# Patient Record
Sex: MALE | Race: WHITE | NOT HISPANIC OR LATINO | Employment: OTHER | URBAN - NONMETROPOLITAN AREA
[De-identification: names, ages, dates, MRNs, and addresses within clinical notes are randomized per-mention and may not be internally consistent; named-entity substitution may affect disease eponyms.]

---

## 2023-05-08 DIAGNOSIS — E03.9 HYPOTHYROIDISM, UNSPECIFIED TYPE: ICD-10-CM

## 2023-05-08 DIAGNOSIS — F41.8 ANXIETY WITH DEPRESSION: ICD-10-CM

## 2023-05-08 RX ORDER — LEVOTHYROXINE SODIUM 50 UG/1
50 TABLET ORAL
Qty: 90 TABLET | Refills: 0 | Status: SHIPPED | OUTPATIENT
Start: 2023-05-08 | End: 2023-08-22 | Stop reason: SDUPTHER

## 2023-05-08 RX ORDER — CITALOPRAM 40 MG/1
40 TABLET, FILM COATED ORAL DAILY
Qty: 90 TABLET | Refills: 0 | Status: SHIPPED | OUTPATIENT
Start: 2023-05-08 | End: 2023-08-09 | Stop reason: SDUPTHER

## 2023-05-08 RX ORDER — CITALOPRAM 40 MG/1
40 TABLET, FILM COATED ORAL DAILY
COMMUNITY
Start: 2022-02-11 | End: 2023-05-08 | Stop reason: SDUPTHER

## 2023-05-08 RX ORDER — LEVOTHYROXINE SODIUM 50 UG/1
50 TABLET ORAL
COMMUNITY
Start: 2022-02-11 | End: 2023-05-08 | Stop reason: SDUPTHER

## 2023-08-09 DIAGNOSIS — F41.8 ANXIETY WITH DEPRESSION: ICD-10-CM

## 2023-08-09 RX ORDER — CITALOPRAM 40 MG/1
40 TABLET, FILM COATED ORAL DAILY
Qty: 90 TABLET | Refills: 1 | Status: SHIPPED | OUTPATIENT
Start: 2023-08-09 | End: 2023-11-13

## 2023-08-22 ENCOUNTER — TELEPHONE (OUTPATIENT)
Dept: PRIMARY CARE | Facility: CLINIC | Age: 73
End: 2023-08-22
Payer: MEDICARE

## 2023-08-22 DIAGNOSIS — E03.9 HYPOTHYROIDISM, UNSPECIFIED TYPE: ICD-10-CM

## 2023-08-22 RX ORDER — LEVOTHYROXINE SODIUM 50 UG/1
50 TABLET ORAL
Qty: 90 TABLET | Refills: 0 | Status: SHIPPED | OUTPATIENT
Start: 2023-08-22 | End: 2023-11-29

## 2023-10-26 ENCOUNTER — OFFICE VISIT (OUTPATIENT)
Dept: PRIMARY CARE | Facility: CLINIC | Age: 73
End: 2023-10-26
Payer: MEDICARE

## 2023-10-26 VITALS
OXYGEN SATURATION: 97 % | HEART RATE: 72 BPM | TEMPERATURE: 96.5 F | WEIGHT: 211 LBS | DIASTOLIC BLOOD PRESSURE: 70 MMHG | SYSTOLIC BLOOD PRESSURE: 118 MMHG | BODY MASS INDEX: 30.21 KG/M2 | HEIGHT: 70 IN

## 2023-10-26 DIAGNOSIS — R76.9 ABNORMAL IMMUNOLOGICAL FINDING IN SERUM, UNSPECIFIED: ICD-10-CM

## 2023-10-26 DIAGNOSIS — R09.89 BRUIT OF RIGHT CAROTID ARTERY: ICD-10-CM

## 2023-10-26 DIAGNOSIS — Z71.89 CARDIAC RISK COUNSELING: ICD-10-CM

## 2023-10-26 DIAGNOSIS — E55.9 VITAMIN D DEFICIENCY, UNSPECIFIED: ICD-10-CM

## 2023-10-26 DIAGNOSIS — Z13.6 SCREENING FOR CARDIOVASCULAR CONDITION: ICD-10-CM

## 2023-10-26 DIAGNOSIS — R73.03 PREDIABETES: ICD-10-CM

## 2023-10-26 DIAGNOSIS — Z00.00 ROUTINE GENERAL MEDICAL EXAMINATION AT HEALTH CARE FACILITY: Primary | ICD-10-CM

## 2023-10-26 DIAGNOSIS — Z12.5 SCREENING FOR PROSTATE CANCER: ICD-10-CM

## 2023-10-26 DIAGNOSIS — Z11.4 SCREENING FOR HIV WITHOUT PRESENCE OF RISK FACTORS: ICD-10-CM

## 2023-10-26 DIAGNOSIS — Z00.00 HEALTHCARE MAINTENANCE: ICD-10-CM

## 2023-10-26 PROBLEM — L30.9 ECZEMA: Status: ACTIVE | Noted: 2023-10-26

## 2023-10-26 PROBLEM — F41.9 ANXIETY AND DEPRESSION: Status: ACTIVE | Noted: 2019-07-31

## 2023-10-26 PROBLEM — C18.9 COLON CANCER (MULTI): Status: ACTIVE | Noted: 2023-10-26

## 2023-10-26 PROBLEM — Z85.850 HISTORY OF THYROID CANCER: Status: ACTIVE | Noted: 2019-07-31

## 2023-10-26 PROBLEM — L40.50 PSORIASIS WITH ARTHROPATHY (MULTI): Status: ACTIVE | Noted: 2023-10-26

## 2023-10-26 PROBLEM — G56.03 BILATERAL CARPAL TUNNEL SYNDROME: Status: ACTIVE | Noted: 2023-10-26

## 2023-10-26 PROBLEM — M25.50 ARTHRALGIA OF MULTIPLE JOINTS: Status: ACTIVE | Noted: 2023-10-26

## 2023-10-26 PROBLEM — C73 THYROID CANCER (MULTI): Status: ACTIVE | Noted: 2023-10-26

## 2023-10-26 PROBLEM — E03.9 HYPOTHYROIDISM: Status: ACTIVE | Noted: 2023-10-26

## 2023-10-26 PROBLEM — F32.A ANXIETY AND DEPRESSION: Status: ACTIVE | Noted: 2019-07-31

## 2023-10-26 PROBLEM — R79.89 LOW TESTOSTERONE: Status: ACTIVE | Noted: 2019-07-31

## 2023-10-26 PROBLEM — E89.0 POSTOPERATIVE HYPOTHYROIDISM: Status: ACTIVE | Noted: 2019-07-31

## 2023-10-26 PROCEDURE — 1170F FXNL STATUS ASSESSED: CPT

## 2023-10-26 PROCEDURE — 1036F TOBACCO NON-USER: CPT

## 2023-10-26 PROCEDURE — 80061 LIPID PANEL: CPT

## 2023-10-26 PROCEDURE — 84443 ASSAY THYROID STIM HORMONE: CPT

## 2023-10-26 PROCEDURE — 80053 COMPREHEN METABOLIC PANEL: CPT

## 2023-10-26 PROCEDURE — G0103 PSA SCREENING: HCPCS

## 2023-10-26 PROCEDURE — 1159F MED LIST DOCD IN RCRD: CPT

## 2023-10-26 PROCEDURE — 83036 HEMOGLOBIN GLYCOSYLATED A1C: CPT

## 2023-10-26 PROCEDURE — 86803 HEPATITIS C AB TEST: CPT

## 2023-10-26 PROCEDURE — 85025 COMPLETE CBC W/AUTO DIFF WBC: CPT

## 2023-10-26 PROCEDURE — 36415 COLL VENOUS BLD VENIPUNCTURE: CPT

## 2023-10-26 PROCEDURE — G0439 PPPS, SUBSEQ VISIT: HCPCS

## 2023-10-26 PROCEDURE — 87389 HIV-1 AG W/HIV-1&-2 AB AG IA: CPT

## 2023-10-26 PROCEDURE — 82306 VITAMIN D 25 HYDROXY: CPT

## 2023-10-26 ASSESSMENT — ACTIVITIES OF DAILY LIVING (ADL)
TAKING_MEDICATION: INDEPENDENT
DRESSING: INDEPENDENT
MANAGING_FINANCES: INDEPENDENT
BATHING: INDEPENDENT
DOING_HOUSEWORK: INDEPENDENT
GROCERY_SHOPPING: INDEPENDENT

## 2023-10-26 ASSESSMENT — ENCOUNTER SYMPTOMS
WEAKNESS: 0
HEADACHES: 0
SHORTNESS OF BREATH: 0
PSYCHIATRIC NEGATIVE: 1
GASTROINTESTINAL NEGATIVE: 1
LOSS OF SENSATION IN FEET: 0
ALLERGIC/IMMUNOLOGIC NEGATIVE: 1
OCCASIONAL FEELINGS OF UNSTEADINESS: 0
UNEXPECTED WEIGHT CHANGE: 0
FATIGUE: 0
DIZZINESS: 0
DEPRESSION: 0
RESPIRATORY NEGATIVE: 1
CARDIOVASCULAR NEGATIVE: 1
ACTIVITY CHANGE: 0
ENDOCRINE NEGATIVE: 1
MUSCULOSKELETAL NEGATIVE: 1
ABDOMINAL PAIN: 0
FEVER: 0

## 2023-10-26 ASSESSMENT — PATIENT HEALTH QUESTIONNAIRE - PHQ9
SUM OF ALL RESPONSES TO PHQ9 QUESTIONS 1 AND 2: 0
1. LITTLE INTEREST OR PLEASURE IN DOING THINGS: NOT AT ALL
2. FEELING DOWN, DEPRESSED OR HOPELESS: NOT AT ALL

## 2023-10-26 ASSESSMENT — VISUAL ACUITY
OD_CC: 20/50
OS_CC: 20/30

## 2023-10-26 NOTE — PROGRESS NOTES
"Subjective   Reason for Visit: Calos Tillman is an 73 y.o. male here for a Medicare Wellness visit.          Reviewed all medications by prescribing practitioner or clinical pharmacist (such as prescriptions, OTCs, herbal therapies and supplements) and documented in the medical record.    Medicare wellness and establishing care today  Due for lab work today    Sensation of \"Vibration\" in left chest wall, region is inferior to where Med Port was removed previously following Chemo for colon cancer  Does not have associated palpitations  No chest pain  Occurs almost daily    Family History of CVD in his brother, MI with stent placement a couple years ago  He has Carotid Bruit and given history, will do CT Cardiac scoring. US Carotid.     Allergic Rhinitis  Chronic, taking Decongestant for years, BP stable.     Prediabetes  Glucose on past draws have been borderline  Uses sugar in coffee  A1c today.         Patient Care Team:  PREET Bryant-CNP as PCP - General (Family Medicine)     Review of Systems   Constitutional:  Negative for activity change, fatigue, fever and unexpected weight change.   HENT: Negative.     Respiratory: Negative.  Negative for shortness of breath.    Cardiovascular: Negative.  Negative for chest pain.        Vibratory sense in chest wall left side.    Gastrointestinal: Negative.  Negative for abdominal pain.   Endocrine: Negative.    Musculoskeletal: Negative.    Skin: Negative.    Allergic/Immunologic: Negative.    Neurological:  Negative for dizziness, weakness and headaches.   Psychiatric/Behavioral: Negative.         Objective   Vitals:  /70   Pulse 72   Temp 35.8 °C (96.5 °F)   Ht 1.778 m (5' 10\")   Wt 95.7 kg (211 lb)   SpO2 97%   BMI 30.28 kg/m²       Physical Exam  Vitals and nursing note reviewed.   Constitutional:       Appearance: Normal appearance.   HENT:      Head: Normocephalic.      Mouth/Throat:      Mouth: Mucous membranes are moist.   Cardiovascular:      " "Rate and Rhythm: Normal rate and regular rhythm.      Pulses: Normal pulses.           Carotid pulses are  on the right side with bruit.     Heart sounds: Normal heart sounds, S1 normal and S2 normal. No murmur heard.     No friction rub. No gallop.   Pulmonary:      Effort: Pulmonary effort is normal. No respiratory distress.      Breath sounds: Normal breath sounds. No wheezing.   Chest:          Comments: Med Port previously within [place], scar is CDI   Arrow delineates where the \"vibration\" sense is  No tenderness on palpation, no erythema, no warmth.   Abdominal:      General: Bowel sounds are normal. There is no distension.      Palpations: Abdomen is soft.      Tenderness: There is no abdominal tenderness.   Musculoskeletal:         General: No deformity. Normal range of motion.      Right lower leg: No edema.      Left lower leg: No edema.   Skin:     General: Skin is warm and dry.      Capillary Refill: Capillary refill takes less than 2 seconds.   Neurological:      General: No focal deficit present.      Mental Status: He is alert and oriented to person, place, and time.   Psychiatric:         Mood and Affect: Mood normal.         Assessment/Plan   Problem List Items Addressed This Visit    None  Visit Diagnoses       Routine general medical examination at health care facility    -  Primary    Relevant Orders    1 Year Follow Up In Primary Care - Wellness Exam    Screening for cardiovascular condition        Cardiac risk counseling        Relevant Orders    Vascular US carotid artery duplex bilateral    CT cardiac scoring wo IV contrast    Screening for HIV without presence of risk factors        Screening for prostate cancer        Relevant Orders    Prostate Specific Antigen, Screen    Healthcare maintenance        Relevant Orders    Hepatitis C Antibody    HIV 1/2 Antigen/Antibody Screen with Reflex to Confirmation    Vitamin D 25-Hydroxy,Total (for eval of Vitamin D levels)    CBC and Auto " Differential    Comprehensive Metabolic Panel    Lipid Panel    TSH with reflex to Free T4 if abnormal    Bruit of right carotid artery        Relevant Orders    Vascular US carotid artery duplex bilateral    Prediabetes        Relevant Orders    Hemoglobin A1C    Vitamin D deficiency, unspecified        Relevant Orders    Vitamin D 25-Hydroxy,Total (for eval of Vitamin D levels)    Abnormal immunological finding in serum, unspecified        Relevant Orders    CBC and Auto Differential

## 2023-10-26 NOTE — PATIENT INSTRUCTIONS
CT Cardiac Scoring, US carotid   Labs today, we will call with results.     Thank you for coming in today, if any questions or concerns arise, please call my office.   PREET Bryant-CNP

## 2023-10-27 ENCOUNTER — TELEPHONE (OUTPATIENT)
Dept: PRIMARY CARE | Facility: CLINIC | Age: 73
End: 2023-10-27
Payer: MEDICARE

## 2023-10-27 LAB
25(OH)D3 SERPL-MCNC: 39 NG/ML (ref 30–100)
ALBUMIN SERPL BCP-MCNC: 4.7 G/DL (ref 3.4–5)
ALP SERPL-CCNC: 67 U/L (ref 33–136)
ALT SERPL W P-5'-P-CCNC: 33 U/L (ref 10–52)
ANION GAP SERPL CALC-SCNC: 17 MMOL/L (ref 10–20)
AST SERPL W P-5'-P-CCNC: 24 U/L (ref 9–39)
BASOPHILS # BLD AUTO: 0.04 X10*3/UL (ref 0–0.1)
BASOPHILS NFR BLD AUTO: 0.8 %
BILIRUB SERPL-MCNC: 0.5 MG/DL (ref 0–1.2)
BUN SERPL-MCNC: 14 MG/DL (ref 6–23)
CALCIUM SERPL-MCNC: 10.2 MG/DL (ref 8.6–10.6)
CHLORIDE SERPL-SCNC: 101 MMOL/L (ref 98–107)
CHOLEST SERPL-MCNC: 181 MG/DL (ref 0–199)
CHOLESTEROL/HDL RATIO: 3.9
CO2 SERPL-SCNC: 26 MMOL/L (ref 21–32)
CREAT SERPL-MCNC: 0.83 MG/DL (ref 0.5–1.3)
EOSINOPHIL # BLD AUTO: 0.15 X10*3/UL (ref 0–0.4)
EOSINOPHIL NFR BLD AUTO: 3.2 %
ERYTHROCYTE [DISTWIDTH] IN BLOOD BY AUTOMATED COUNT: 13.6 % (ref 11.5–14.5)
EST. AVERAGE GLUCOSE BLD GHB EST-MCNC: 123 MG/DL
GFR SERPL CREATININE-BSD FRML MDRD: >90 ML/MIN/1.73M*2
GLUCOSE SERPL-MCNC: 104 MG/DL (ref 74–99)
HBA1C MFR BLD: 5.9 %
HCT VFR BLD AUTO: 44.5 % (ref 41–52)
HCV AB SER QL: NONREACTIVE
HDLC SERPL-MCNC: 46.8 MG/DL
HGB BLD-MCNC: 14.3 G/DL (ref 13.5–17.5)
HIV 1+2 AB+HIV1 P24 AG SERPL QL IA: NONREACTIVE
IMM GRANULOCYTES # BLD AUTO: 0.03 X10*3/UL (ref 0–0.5)
IMM GRANULOCYTES NFR BLD AUTO: 0.6 % (ref 0–0.9)
LDLC SERPL CALC-MCNC: 93 MG/DL
LYMPHOCYTES # BLD AUTO: 1.81 X10*3/UL (ref 0.8–3)
LYMPHOCYTES NFR BLD AUTO: 38 %
MCH RBC QN AUTO: 26.9 PG (ref 26–34)
MCHC RBC AUTO-ENTMCNC: 32.1 G/DL (ref 32–36)
MCV RBC AUTO: 84 FL (ref 80–100)
MONOCYTES # BLD AUTO: 0.48 X10*3/UL (ref 0.05–0.8)
MONOCYTES NFR BLD AUTO: 10.1 %
NEUTROPHILS # BLD AUTO: 2.25 X10*3/UL (ref 1.6–5.5)
NEUTROPHILS NFR BLD AUTO: 47.3 %
NON HDL CHOLESTEROL: 134 MG/DL (ref 0–149)
NRBC BLD-RTO: 0 /100 WBCS (ref 0–0)
PLATELET # BLD AUTO: 257 X10*3/UL (ref 150–450)
PMV BLD AUTO: 10.5 FL (ref 7.5–11.5)
POTASSIUM SERPL-SCNC: 4.4 MMOL/L (ref 3.5–5.3)
PROT SERPL-MCNC: 7.3 G/DL (ref 6.4–8.2)
PSA SERPL-MCNC: 1.28 NG/ML
RBC # BLD AUTO: 5.32 X10*6/UL (ref 4.5–5.9)
SODIUM SERPL-SCNC: 140 MMOL/L (ref 136–145)
TRIGL SERPL-MCNC: 204 MG/DL (ref 0–149)
TSH SERPL-ACNC: 1.34 MIU/L (ref 0.44–3.98)
VLDL: 41 MG/DL (ref 0–40)
WBC # BLD AUTO: 4.8 X10*3/UL (ref 4.4–11.3)

## 2023-10-27 NOTE — RESULT ENCOUNTER NOTE
Labs look good, triglycerides are up, exercise and healthy diet, otherwise cholesterol numbers are better. TSH normal, PSA normal.

## 2023-10-27 NOTE — TELEPHONE ENCOUNTER
----- Message from PREET Bryant-CNP sent at 10/27/2023  8:26 AM EDT -----  Labs look good, triglycerides are up, exercise and healthy diet, otherwise cholesterol numbers are better. TSH normal, PSA normal.

## 2023-10-30 ENCOUNTER — TELEPHONE (OUTPATIENT)
Dept: PRIMARY CARE | Facility: CLINIC | Age: 73
End: 2023-10-30
Payer: MEDICARE

## 2023-11-08 DIAGNOSIS — F41.8 ANXIETY WITH DEPRESSION: ICD-10-CM

## 2023-11-13 RX ORDER — CITALOPRAM 40 MG/1
40 TABLET, FILM COATED ORAL DAILY
Qty: 90 TABLET | Refills: 1 | Status: SHIPPED | OUTPATIENT
Start: 2023-11-13 | End: 2024-02-13 | Stop reason: SDUPTHER

## 2023-11-27 DIAGNOSIS — E03.9 HYPOTHYROIDISM, UNSPECIFIED TYPE: ICD-10-CM

## 2023-11-29 DIAGNOSIS — L30.9 ECZEMA, UNSPECIFIED TYPE: ICD-10-CM

## 2023-11-29 RX ORDER — CICLOPIROX OLAMINE 7.7 MG/G
CREAM TOPICAL 2 TIMES DAILY
COMMUNITY
End: 2023-11-29 | Stop reason: SDUPTHER

## 2023-11-29 RX ORDER — TRIAMCINOLONE ACETONIDE 1 MG/G
CREAM TOPICAL 2 TIMES DAILY
COMMUNITY
Start: 2021-09-14 | End: 2023-11-29 | Stop reason: SDUPTHER

## 2023-11-29 RX ORDER — TRIAMCINOLONE ACETONIDE 1 MG/G
CREAM TOPICAL 2 TIMES DAILY
Qty: 2 G | Refills: 0 | Status: SHIPPED | OUTPATIENT
Start: 2023-11-29

## 2023-11-29 RX ORDER — LEVOTHYROXINE SODIUM 50 UG/1
50 TABLET ORAL
Qty: 90 TABLET | Refills: 0 | Status: SHIPPED | OUTPATIENT
Start: 2023-11-29 | End: 2024-03-08 | Stop reason: SDUPTHER

## 2023-11-29 RX ORDER — CICLOPIROX OLAMINE 7.7 MG/G
CREAM TOPICAL 2 TIMES DAILY
Qty: 2 G | Refills: 0 | Status: SHIPPED | OUTPATIENT
Start: 2023-11-29

## 2024-01-05 ENCOUNTER — HOSPITAL ENCOUNTER (OUTPATIENT)
Dept: VASCULAR MEDICINE | Facility: HOSPITAL | Age: 74
Discharge: HOME | End: 2024-01-05
Payer: MEDICARE

## 2024-01-05 ENCOUNTER — HOSPITAL ENCOUNTER (OUTPATIENT)
Dept: RADIOLOGY | Facility: HOSPITAL | Age: 74
Discharge: HOME | End: 2024-01-05
Payer: MEDICARE

## 2024-01-05 ENCOUNTER — TELEPHONE (OUTPATIENT)
Dept: PRIMARY CARE | Facility: CLINIC | Age: 74
End: 2024-01-05

## 2024-01-05 DIAGNOSIS — Z71.89 CARDIAC RISK COUNSELING: ICD-10-CM

## 2024-01-05 DIAGNOSIS — R09.89 BRUIT OF RIGHT CAROTID ARTERY: ICD-10-CM

## 2024-01-05 PROCEDURE — 93880 EXTRACRANIAL BILAT STUDY: CPT

## 2024-01-05 PROCEDURE — 93880 EXTRACRANIAL BILAT STUDY: CPT | Performed by: INTERNAL MEDICINE

## 2024-01-05 PROCEDURE — 75571 CT HRT W/O DYE W/CA TEST: CPT

## 2024-01-05 NOTE — TELEPHONE ENCOUNTER
----- Message from SHMUEL Bryant sent at 1/5/2024  8:48 AM EST -----  CT Cardiac scoring of zero. Negative study.

## 2024-01-05 NOTE — LETTER
January 5, 2024     Calos Flowerelma  [address] [facility] [address]      Dear Mr. Tillman:    Below are the results from your recent visit:    Resulted Orders   CT cardiac scoring wo IV contrast    Narrative    Interpreted By:  Romero Coto,   STUDY:  CT CARDIAC SCORING WO IV CONTRAST;  1/5/2024 7:17 am      INDICATION:  Signs/Symptoms:Carotid Bruit, Fam Hx premature cardiovascular disease.      COMPARISON:  None.      ACCESSION NUMBER(S):  [id number]      ORDERING CLINICIAN:  KATIE ECHEVARRIA      TECHNIQUE:  Using prospective ECG gating, CT scan of the coronary arteries was  performed without intravenous contrast. Coronary calcium scoring  was  performed according to the method of Agatston.      FINDINGS:  The score and distribution of calcium in the coronary arteries is as  follows:      LM             0  LAD           0  LCx            0  RCA           0      Total         0      The visualized mid/lower ascending thoracic aorta measures 3.5 cm in  diameter. The heart is normal in size. No pericardial effusion is  present.      No gross evidence of mediastinal or hilar lymphadenopathy or masses  is identified. The visualized segments of the lungs are normally  expanded.      The visualized subdiaphragmatic structures appear intact.  Elevation of the right hemidiaphragm        Impression    1. Coronary artery calcium score of  0*.  2. Elevation of the right hemidiaphragm may represent eventration but  correlate with any concern for diaphragmatic dysfunction.      *Coronary artery calcium scoring may be helpful in predicting the  risk for future coronary heart disease events.  According to the  American College of Cardiology Foundation Clinical Expert Consensus  Task Force, such testing provides important prognostic information in  patients with more than one coronary heart disease risk factor. The  coronary artery calcium score correlates with the annual risk of a  non-fatal myocardial infarction  "or coronary heart disease death.      Coronary artery score            Annual Risk      0-99                               0.4%  100-399                          1.3%  >400                              2.4%      These three \"breakpoints\" correspond to lower, intermediate and high  risk states for future coronary events.  Such information should be  used, along with appropriate clinical judgment, to make decisions  regarding the intensity of risk factor management strategies to treat  blood lipids and to modify other non-lipid coronary risk factors.      Reference: Acme P et al. Circulation.  2007; 115:402-426      MACRO:  None      Signed by: Romero Coto 1/5/2024 7:43 AM  Dictation workstation:   [id number]     The test results show negative.     If you have any questions or concerns, please don't hesitate to call.         Sincerely,        LOULOU OROZCO 1  "

## 2024-02-13 ENCOUNTER — OFFICE VISIT (OUTPATIENT)
Dept: PRIMARY CARE | Facility: CLINIC | Age: 74
End: 2024-02-13
Payer: MEDICARE

## 2024-02-13 VITALS
DIASTOLIC BLOOD PRESSURE: 80 MMHG | SYSTOLIC BLOOD PRESSURE: 120 MMHG | HEART RATE: 85 BPM | BODY MASS INDEX: 30.85 KG/M2 | WEIGHT: 215 LBS | TEMPERATURE: 97.3 F | OXYGEN SATURATION: 97 %

## 2024-02-13 DIAGNOSIS — F41.8 ANXIETY WITH DEPRESSION: ICD-10-CM

## 2024-02-13 DIAGNOSIS — J01.00 ACUTE NON-RECURRENT MAXILLARY SINUSITIS: Primary | ICD-10-CM

## 2024-02-13 PROCEDURE — 1036F TOBACCO NON-USER: CPT

## 2024-02-13 PROCEDURE — 99214 OFFICE O/P EST MOD 30 MIN: CPT

## 2024-02-13 PROCEDURE — 1159F MED LIST DOCD IN RCRD: CPT

## 2024-02-13 RX ORDER — CITALOPRAM 40 MG/1
40 TABLET, FILM COATED ORAL DAILY
Qty: 90 TABLET | Refills: 1 | Status: SHIPPED | OUTPATIENT
Start: 2024-02-13 | End: 2024-05-20

## 2024-02-13 RX ORDER — LORATADINE PSEUDOEPHEDRINE SULFATE 10; 240 MG/1; MG/1
1 TABLET, EXTENDED RELEASE ORAL DAILY
COMMUNITY
End: 2024-06-07 | Stop reason: SDUPTHER

## 2024-02-13 RX ORDER — DOXYCYCLINE 100 MG/1
100 TABLET ORAL 2 TIMES DAILY
Qty: 20 TABLET | Refills: 0 | Status: SHIPPED | OUTPATIENT
Start: 2024-02-13 | End: 2024-02-23

## 2024-02-13 ASSESSMENT — ENCOUNTER SYMPTOMS: SORE THROAT: 1

## 2024-02-13 NOTE — PROGRESS NOTES
Subjective   Patient ID: Calos Tillman is a 73 y.o. male who presents for Sore Throat (Calos is here for a sore throat for about a week. Has right ear/gland pain, and green mucous drainage. He is starting to feel better today. ).  11 days  Consevative treatments not working    Right sided gland swelling.     Sore Throat   Associated symptoms include congestion.       Vitals:    02/13/24 1512   BP: 120/80   Pulse: 85   Temp: 36.3 °C (97.3 °F)   SpO2: 97%       Review of Systems   Constitutional:  Positive for fatigue. Negative for fever.   HENT:  Positive for congestion, dental problem, postnasal drip, rhinorrhea, sinus pressure and sore throat. Negative for hearing loss.    Eyes:  Positive for discharge and itching.   Respiratory: Negative.     Cardiovascular: Negative.    Gastrointestinal: Negative.    Endocrine: Negative.    Genitourinary: Negative.    Musculoskeletal: Negative.    Skin: Negative.    Allergic/Immunologic: Negative.    Neurological: Negative.    Hematological: Negative.    Psychiatric/Behavioral: Negative.         Objective   Physical Exam  Constitutional:       General: He is not in acute distress.     Appearance: Normal appearance.   HENT:      Head: Normocephalic.      Right Ear: Tympanic membrane normal.      Left Ear: Tympanic membrane normal.      Nose: Congestion and rhinorrhea present.      Mouth/Throat:      Pharynx: Oropharyngeal exudate and posterior oropharyngeal erythema present.   Eyes:      Extraocular Movements: Extraocular movements intact.      Pupils: Pupils are equal, round, and reactive to light.   Cardiovascular:      Rate and Rhythm: Normal rate and regular rhythm.   Pulmonary:      Effort: No respiratory distress.   Abdominal:      General: Abdomen is flat. There is no distension.   Skin:     General: Skin is warm and dry.      Capillary Refill: Capillary refill takes less than 2 seconds.   Neurological:      Mental Status: He is alert.   Psychiatric:         Mood and  Affect: Mood normal.         Assessment/Plan   Problem List Items Addressed This Visit    None  Visit Diagnoses       Acute non-recurrent maxillary sinusitis    -  Primary    Relevant Medications    doxycycline (Adoxa) 100 mg tablet    Anxiety with depression        Relevant Medications    citalopram (CeleXA) 40 mg tablet                 Thank you for coming in today, please call my office if you have any concerns or questions.     Stephan OVIEDO, CNP

## 2024-02-19 ASSESSMENT — ENCOUNTER SYMPTOMS
EYE DISCHARGE: 1
EYE ITCHING: 1
ENDOCRINE NEGATIVE: 1
RESPIRATORY NEGATIVE: 1
MUSCULOSKELETAL NEGATIVE: 1
GASTROINTESTINAL NEGATIVE: 1
RHINORRHEA: 1
NEUROLOGICAL NEGATIVE: 1
FEVER: 0
CARDIOVASCULAR NEGATIVE: 1
SINUS PRESSURE: 1
ALLERGIC/IMMUNOLOGIC NEGATIVE: 1
HEMATOLOGIC/LYMPHATIC NEGATIVE: 1
FATIGUE: 1
PSYCHIATRIC NEGATIVE: 1

## 2024-02-23 ENCOUNTER — TELEPHONE (OUTPATIENT)
Dept: PRIMARY CARE | Facility: CLINIC | Age: 74
End: 2024-02-23
Payer: MEDICARE

## 2024-02-23 DIAGNOSIS — J01.00 ACUTE NON-RECURRENT MAXILLARY SINUSITIS: Primary | ICD-10-CM

## 2024-02-23 RX ORDER — AMOXICILLIN AND CLAVULANATE POTASSIUM 875; 125 MG/1; MG/1
875 TABLET, FILM COATED ORAL 2 TIMES DAILY
Qty: 14 TABLET | Refills: 0 | Status: SHIPPED | OUTPATIENT
Start: 2024-02-23 | End: 2024-03-01

## 2024-02-23 NOTE — TELEPHONE ENCOUNTER
Calos called stating he is out of the antibiotics and is still having a sore throat and green phlegm. Stated he has been on a lot of antibiotics and Augmentin works the best for him. Please advise.

## 2024-02-26 NOTE — TELEPHONE ENCOUNTER
Spoke with Calos at 2:52AM. Verbalized understanding and will continue treatment and notify us at the end of the week if he is not any better.

## 2024-03-08 DIAGNOSIS — E03.9 HYPOTHYROIDISM, UNSPECIFIED TYPE: ICD-10-CM

## 2024-03-08 RX ORDER — LEVOTHYROXINE SODIUM 50 UG/1
50 TABLET ORAL
Qty: 90 TABLET | Refills: 0 | Status: SHIPPED | OUTPATIENT
Start: 2024-03-08 | End: 2024-06-07 | Stop reason: SDUPTHER

## 2024-04-05 ENCOUNTER — HOSPITAL ENCOUNTER (OUTPATIENT)
Dept: RADIOLOGY | Facility: CLINIC | Age: 74
Discharge: HOME | End: 2024-04-05
Payer: MEDICARE

## 2024-04-05 ENCOUNTER — TELEPHONE (OUTPATIENT)
Dept: PRIMARY CARE | Facility: CLINIC | Age: 74
End: 2024-04-05

## 2024-04-05 ENCOUNTER — OFFICE VISIT (OUTPATIENT)
Dept: PRIMARY CARE | Facility: CLINIC | Age: 74
End: 2024-04-05
Payer: MEDICARE

## 2024-04-05 VITALS
OXYGEN SATURATION: 97 % | TEMPERATURE: 97.5 F | WEIGHT: 211 LBS | HEART RATE: 67 BPM | SYSTOLIC BLOOD PRESSURE: 120 MMHG | DIASTOLIC BLOOD PRESSURE: 70 MMHG | BODY MASS INDEX: 30.28 KG/M2

## 2024-04-05 DIAGNOSIS — R05.1 ACUTE COUGH: ICD-10-CM

## 2024-04-05 DIAGNOSIS — J18.9 COMMUNITY ACQUIRED PNEUMONIA, UNSPECIFIED LATERALITY: ICD-10-CM

## 2024-04-05 DIAGNOSIS — J18.9 COMMUNITY ACQUIRED PNEUMONIA, UNSPECIFIED LATERALITY: Primary | ICD-10-CM

## 2024-04-05 PROCEDURE — 71046 X-RAY EXAM CHEST 2 VIEWS: CPT | Performed by: RADIOLOGY

## 2024-04-05 PROCEDURE — 1159F MED LIST DOCD IN RCRD: CPT

## 2024-04-05 PROCEDURE — 87637 SARSCOV2&INF A&B&RSV AMP PRB: CPT

## 2024-04-05 PROCEDURE — 99214 OFFICE O/P EST MOD 30 MIN: CPT

## 2024-04-05 PROCEDURE — 71046 X-RAY EXAM CHEST 2 VIEWS: CPT

## 2024-04-05 RX ORDER — BENZONATATE 200 MG/1
200 CAPSULE ORAL 3 TIMES DAILY PRN
Qty: 30 CAPSULE | Refills: 0 | Status: SHIPPED | OUTPATIENT
Start: 2024-04-05 | End: 2024-04-15

## 2024-04-05 RX ORDER — AZITHROMYCIN 250 MG/1
TABLET, FILM COATED ORAL
Qty: 6 TABLET | Refills: 0 | Status: SHIPPED | OUTPATIENT
Start: 2024-04-05 | End: 2024-04-09

## 2024-04-05 RX ORDER — AMOXICILLIN AND CLAVULANATE POTASSIUM 875; 125 MG/1; MG/1
875 TABLET, FILM COATED ORAL 2 TIMES DAILY
Qty: 20 TABLET | Refills: 0 | Status: SHIPPED | OUTPATIENT
Start: 2024-04-05 | End: 2024-04-15

## 2024-04-05 NOTE — RESULT ENCOUNTER NOTE
Results were abnormal... no pneumonia noted on xray, likely upper respiratory infection, I would like him to take antibiotics through the weekend and we can call Monday with results of the viral swab.

## 2024-04-05 NOTE — LETTER
April 9, 2024     Calos Tillman  [address]  [facility] [address]      Dear Mr. Tillman:    Below are the results from your recent visit:    Resulted Orders   Sars-CoV-2 PCR   Result Value Ref Range    Coronavirus 2019, PCR Not Detected Not Detected    Narrative    This assay has received FDA Emergency Use Authorization (EUA) and is only authorized for the duration of time that circumstances exist to justify the authorization of the emergency use of in vitro diagnostic tests for the detection of SARS-CoV-2 virus and/or diagnosis of COVID-19 infection under section 564(b)(1) of the Act, 21 U.S.C. 360bbb-3(b)(1). This assay is an in vitro diagnostic nucleic acid amplification test for the qualitative detection of SARS-CoV-2 from nasopharyngeal specimens and has been validated for use at [facility]. Negative results do not preclude COVID-19 infections and should not be used as the sole basis for diagnosis, treatment, or other management decisions.     Influenza A, and B PCR   Result Value Ref Range    Flu A Result Not Detected Not Detected    Flu B Result Not Detected Not Detected    Narrative    This assay is an in vitro diagnostic multiplex nucleic acid amplification test for the detection and discrimination of Influenza A & B from nasopharyngeal specimens, and has been validated for use at [facility]. Negative results do not preclude Influenza A/B infections, and should not be used as the sole basis for diagnosis, treatment, or other management decisions. If Influenza A/B and RSV PCR results are negative, testing for Parainfluenza virus, Adenovirus and Metapneumovirus is routinely performed for [facility] pediatric oncology and intensive care inpatients, and is available on other patients by placing an add-on request.   RSV PCR   Result Value Ref Range    RSV PCR Not Detected Not Detected    Narrative    This assay is an FDA-cleared, in vitro diagnostic nucleic  acid amplification test for the detection of RSV from nasopharyngeal specimens, and has been validated for use at [facility]. Negative results do not preclude RSV infections, and should not be used as the sole basis for diagnosis, treatment, or other management decisions. If Influenza A/B and RSV PCR results are negative, testing for Parainfluenza virus, Adenovirus and Metapneumovirus is routinely performed for pediatric oncology and intensive care inpatients at [facility], and is available on other patients by placing an add-on request.         The test results came back negative. Please continue current treatment plan.   If you have any questions or concerns, please don't hesitate to call.         Sincerely,        Stephan Stiles, APRN-CNP

## 2024-04-05 NOTE — PROGRESS NOTES
Subjective   Patient ID: Calos Tillman is a 73 y.o. male who presents for Sick Visit (Calos is here for a cough(green phelgm), chills, sweats, body aches, and extreme headaches. Has been in contact with his daughter who was sick. ).  Viral Syndrome    Chest Congestion  Cough  Facial pain  Allergic shiners    Chills, no fevers  Tried Mucinex.    Subjective  Calos Tillman is a 73 y.o. male who presents for evaluation of cough, possible pneumonia. Symptoms include: shortness of breath at rest and pleuritic chest pain. Symptoms began 5 days ago, and have been gradually worsening since that time. Patient denies cough. Treatment thus far includes: OTC analgesics/antipyretics: somewhat effective. Past pulmonary history is significant for no history of pneumonia or bronchitis.    Objective  Oxygen saturation 97% on room air  [unfilled]     Assessment/Plan  Community acquired pneumonia.    The diagnosis was discussed along with the usual course and treatment.  Educational material distributed.  Started antibiotics per medication orders.          Vitals:    04/05/24 1305   BP: 120/70   Pulse: 67   Temp: 36.4 °C (97.5 °F)   SpO2: 97%       Review of Systems    Objective   Physical Exam    Assessment/Plan   Problem List Items Addressed This Visit    None  Visit Diagnoses       Community acquired pneumonia, unspecified laterality    -  Primary    Relevant Medications    amoxicillin-pot clavulanate (Augmentin) 875-125 mg tablet    azithromycin (Zithromax) 250 mg tablet    benzonatate (Tessalon) 200 mg capsule    Other Relevant Orders    XR chest 2 views    Acute cough        Relevant Orders    Sars-CoV-2 PCR    Influenza A, and B PCR    RSV PCR                 Thank you for coming in today, please call my office if you have any concerns or questions.     Stephan OVIEDO, CNP

## 2024-04-05 NOTE — PATIENT INSTRUCTIONS
Double coverage antibiotics, start immediately  STAT Xray of the chest today in [place]    Call if no better Monday    Thank you for coming in today, if any questions or concerns arise, please call my office.   PREET Bryant-CNP

## 2024-04-05 NOTE — TELEPHONE ENCOUNTER
----- Message from PREET Bryant-CNP sent at 4/5/2024  2:59 PM EDT -----  Results were abnormal... no pneumonia noted on xray, likely upper respiratory infection, I would like him to take antibiotics through the weekend and we can call Monday with results of the viral swab.

## 2024-04-06 LAB
FLUAV RNA RESP QL NAA+PROBE: NOT DETECTED
FLUBV RNA RESP QL NAA+PROBE: NOT DETECTED
RSV RNA RESP QL NAA+PROBE: NOT DETECTED
SARS-COV-2 RNA RESP QL NAA+PROBE: NOT DETECTED

## 2024-04-09 NOTE — TELEPHONE ENCOUNTER
----- Message from SHMUEL Bryant sent at 4/8/2024  8:06 AM EDT -----  Results are normal, please notify the patient. Let me know how patient is doing

## 2024-05-17 DIAGNOSIS — F41.8 ANXIETY WITH DEPRESSION: ICD-10-CM

## 2024-05-20 RX ORDER — CITALOPRAM 40 MG/1
40 TABLET, FILM COATED ORAL DAILY
Qty: 90 TABLET | Refills: 1 | Status: SHIPPED | OUTPATIENT
Start: 2024-05-20

## 2024-06-07 DIAGNOSIS — E03.9 HYPOTHYROIDISM, UNSPECIFIED TYPE: ICD-10-CM

## 2024-06-07 DIAGNOSIS — J01.00 ACUTE NON-RECURRENT MAXILLARY SINUSITIS: Primary | ICD-10-CM

## 2024-06-07 RX ORDER — LEVOTHYROXINE SODIUM 50 UG/1
50 TABLET ORAL
Qty: 90 TABLET | Refills: 0 | Status: SHIPPED | OUTPATIENT
Start: 2024-06-07

## 2024-06-07 RX ORDER — LORATADINE PSEUDOEPHEDRINE SULFATE 10; 240 MG/1; MG/1
1 TABLET, EXTENDED RELEASE ORAL DAILY
Qty: 30 TABLET | Refills: 1 | Status: SHIPPED | OUTPATIENT
Start: 2024-06-07 | End: 2024-08-06

## 2024-06-11 ENCOUNTER — OFFICE VISIT (OUTPATIENT)
Dept: PRIMARY CARE | Facility: CLINIC | Age: 74
End: 2024-06-11
Payer: MEDICARE

## 2024-06-11 VITALS
BODY MASS INDEX: 30.13 KG/M2 | WEIGHT: 210 LBS | DIASTOLIC BLOOD PRESSURE: 60 MMHG | TEMPERATURE: 98.5 F | SYSTOLIC BLOOD PRESSURE: 110 MMHG | OXYGEN SATURATION: 97 % | HEART RATE: 72 BPM

## 2024-06-11 DIAGNOSIS — N45.1 EPIDIDYMITIS WITHOUT ABSCESS: Primary | ICD-10-CM

## 2024-06-11 LAB
POC APPEARANCE, URINE: CLEAR
POC BILIRUBIN, URINE: NEGATIVE
POC BLOOD, URINE: NEGATIVE
POC COLOR, URINE: YELLOW
POC GLUCOSE, URINE: NEGATIVE MG/DL
POC KETONES, URINE: NEGATIVE MG/DL
POC LEUKOCYTES, URINE: NEGATIVE
POC NITRITE,URINE: NEGATIVE
POC PH, URINE: 6 PH
POC PROTEIN, URINE: NEGATIVE MG/DL
POC SPECIFIC GRAVITY, URINE: 1.01
POC UROBILINOGEN, URINE: 0.2 EU/DL

## 2024-06-11 PROCEDURE — 99214 OFFICE O/P EST MOD 30 MIN: CPT

## 2024-06-11 PROCEDURE — 1159F MED LIST DOCD IN RCRD: CPT

## 2024-06-11 PROCEDURE — 81003 URINALYSIS AUTO W/O SCOPE: CPT

## 2024-06-11 PROCEDURE — 87086 URINE CULTURE/COLONY COUNT: CPT

## 2024-06-11 RX ORDER — LEVOFLOXACIN 500 MG/1
500 TABLET, FILM COATED ORAL DAILY
Qty: 10 TABLET | Refills: 0 | Status: SHIPPED | OUTPATIENT
Start: 2024-06-11 | End: 2024-06-21

## 2024-06-11 ASSESSMENT — ENCOUNTER SYMPTOMS: ABDOMINAL PAIN: 1

## 2024-06-11 NOTE — PATIENT INSTRUCTIONS
Check UA  Start Levaquin for 10 days  Return if no better    Thank you for coming in today, if any questions or concerns arise, please call my office.   Stephan Stiles, APRWOLFGANG-CNP

## 2024-06-11 NOTE — PROGRESS NOTES
Subjective   Patient ID: Calos Tillman is a 74 y.o. male who presents for Abdominal Pain (Calos is here for lower abdominal pain. Has been hurting since yesterday. No diarrhea or vomiting.).  Lower abdominal pain for the past 5 days  Did self testicular exam, left testicle felt intense pain, passed out, sweating, fall  No fevers since.     He has history of diverticulitis and varicocele in the past.   No dysuria no hematuria.    Abdominal Pain        Vitals:    06/11/24 1122   BP: 110/60   Pulse: 72   Temp: 36.9 °C (98.5 °F)   SpO2: 97%       Review of Systems   Gastrointestinal:  Positive for abdominal pain.       Objective   Physical Exam  Vitals and nursing note reviewed. Chaperone present: declined chaperone.   Constitutional:       Appearance: He is not ill-appearing or diaphoretic.   Genitourinary:     Pubic Area: No rash.       Penis: Normal.       Testes:         Right: Testicular hydrocele or varicocele not present. Cremasteric reflex is present.          Left: Tenderness and varicocele present. Cremasteric reflex is present.       Comments: Crem reflex present bilaterally  Neurological:      Mental Status: He is alert.         Assessment/Plan   Problem List Items Addressed This Visit    None  Visit Diagnoses       Epididymitis without abscess    -  Primary    Relevant Medications    levoFLOXacin (Levaquin) 500 mg tablet    Other Relevant Orders    POCT UA Automated manually resulted (Completed)    Urine Culture                 Thank you for coming in today, please call my office if you have any concerns or questions.     Stephan OVIEDO, CNP

## 2024-06-11 NOTE — LETTER
June 13, 2024     Calos Tillman  [address]  [facility] [address]      Dear Mr. Tillman:    Below are the results from your recent visit:    Resulted Orders   POCT UA Automated manually resulted   Result Value Ref Range    POC Color, Urine Yellow Straw, Yellow, Light-Yellow    POC Appearance, Urine Clear Clear    POC Glucose, Urine NEGATIVE NEGATIVE mg/dl    POC Bilirubin, Urine NEGATIVE NEGATIVE    POC Ketones, Urine NEGATIVE NEGATIVE mg/dl    POC Specific Gravity, Urine 1.010 1.005 - 1.035    POC Blood, Urine NEGATIVE NEGATIVE    POC PH, Urine 6.0 No Reference Range Established PH    POC Protein, Urine NEGATIVE NEGATIVE, 30 (1+) mg/dl    POC Urobilinogen, Urine 0.2 0.2, 1.0 EU/DL    Poc Nitrite, Urine NEGATIVE NEGATIVE    POC Leukocytes, Urine NEGATIVE NEGATIVE   Urine Culture   Result Value Ref Range    Urine Culture No growth      The test results show that your current treatment is working. Please continue current treatment plans.    If you have any questions or concerns, please don't hesitate to call.         Sincerely,        PREET Bryant-CNP

## 2024-06-12 LAB — BACTERIA UR CULT: NO GROWTH

## 2024-06-13 ENCOUNTER — TELEPHONE (OUTPATIENT)
Dept: PRIMARY CARE | Facility: CLINIC | Age: 74
End: 2024-06-13
Payer: MEDICARE

## 2024-06-13 NOTE — RESULT ENCOUNTER NOTE
Results are normal, please notify the patient. Please call patient and check on symptoms, let me know thanks.

## 2024-06-13 NOTE — TELEPHONE ENCOUNTER
----- Message from PREET Bryant-CNP sent at 6/13/2024  8:08 AM EDT -----  Results are normal, please notify the patient. Please call patient and check on symptoms, let me know thanks.

## 2024-07-18 ENCOUNTER — TELEPHONE (OUTPATIENT)
Dept: RHEUMATOLOGY | Facility: CLINIC | Age: 74
End: 2024-07-18
Payer: MEDICARE

## 2024-07-26 ENCOUNTER — APPOINTMENT (OUTPATIENT)
Dept: RHEUMATOLOGY | Facility: CLINIC | Age: 74
End: 2024-07-26
Payer: MEDICARE

## 2024-08-05 ENCOUNTER — OFFICE VISIT (OUTPATIENT)
Dept: PRIMARY CARE | Facility: CLINIC | Age: 74
End: 2024-08-05
Payer: MEDICARE

## 2024-08-05 ENCOUNTER — HOSPITAL ENCOUNTER (OUTPATIENT)
Dept: RADIOLOGY | Facility: CLINIC | Age: 74
Discharge: HOME | End: 2024-08-05
Payer: MEDICARE

## 2024-08-05 VITALS
OXYGEN SATURATION: 96 % | TEMPERATURE: 98.6 F | DIASTOLIC BLOOD PRESSURE: 70 MMHG | SYSTOLIC BLOOD PRESSURE: 120 MMHG | WEIGHT: 212 LBS | HEART RATE: 70 BPM | BODY MASS INDEX: 30.42 KG/M2

## 2024-08-05 DIAGNOSIS — S62.025A CLOSED NONDISPLACED FRACTURE OF MIDDLE THIRD OF SCAPHOID BONE OF LEFT WRIST, INITIAL ENCOUNTER: Primary | ICD-10-CM

## 2024-08-05 DIAGNOSIS — S62.025A CLOSED NONDISPLACED FRACTURE OF MIDDLE THIRD OF SCAPHOID BONE OF LEFT WRIST, INITIAL ENCOUNTER: ICD-10-CM

## 2024-08-05 PROCEDURE — 1159F MED LIST DOCD IN RCRD: CPT

## 2024-08-05 PROCEDURE — 99214 OFFICE O/P EST MOD 30 MIN: CPT

## 2024-08-05 PROCEDURE — 73130 X-RAY EXAM OF HAND: CPT | Mod: LT

## 2024-08-05 PROCEDURE — 73130 X-RAY EXAM OF HAND: CPT | Mod: LEFT SIDE | Performed by: RADIOLOGY

## 2024-08-05 NOTE — PATIENT INSTRUCTIONS
Obtain XR of hand 3+ views  Anti inflammatories Motrin or Tylenol  Continue Voltaren Gel    Thank you for coming in today, if any questions or concerns arise, please call my office.   PREET Bryant-CNP

## 2024-08-05 NOTE — PROGRESS NOTES
Subjective   Patient ID: Calos Tillman is a 74 y.o. male who presents for Wrist Pain (Calos is here for left wrist pain for the past 2 weeks. Has issues gripping, holding items, and turning his wrist. ).  Left wrist pain     Wrist Pain  Patient complains of left wrist pain.  The pain is moderate, worsens with movement, and some relief by rest.  There is associated weakness in the left hand.  Pain has been present for 2 weeks.  There is a history of strain with golfing.        Wrist Pain         Vitals:    08/05/24 1559   BP: 120/70   Pulse: 70   Temp: 37 °C (98.6 °F)   SpO2: 96%       Review of Systems    Objective   Physical Exam  Vitals and nursing note reviewed.   Constitutional:       Appearance: Normal appearance.   Musculoskeletal:      Left wrist: Snuff box tenderness present.   Neurological:      Mental Status: He is alert.         Assessment/Plan   Problem List Items Addressed This Visit    None  Visit Diagnoses       Closed nondisplaced fracture of middle third of scaphoid bone of left wrist, initial encounter    -  Primary    Relevant Orders    XR hand left 3+ views                 Thank you for coming in today, please call my office if you have any concerns or questions.     Stephan OVIEDO, CNP

## 2024-08-09 ENCOUNTER — TELEPHONE (OUTPATIENT)
Dept: PRIMARY CARE | Facility: CLINIC | Age: 74
End: 2024-08-09
Payer: MEDICARE

## 2024-08-09 DIAGNOSIS — S69.92XA WRIST INJURY, LEFT, INITIAL ENCOUNTER: Primary | ICD-10-CM

## 2024-08-09 NOTE — TELEPHONE ENCOUNTER
----- Message from Stephan Stiles sent at 8/9/2024  8:38 AM EDT -----  Results were abnormal... compression brace for the wrist 6 weeks, possible Triquetral fracture, no scaphoid fracture, I will order Dr. Hutchinson consult.

## 2024-08-09 NOTE — RESULT ENCOUNTER NOTE
Results were abnormal... compression brace for the wrist 6 weeks, possible Triquetral fracture, no scaphoid fracture, I will order Dr. Hutchinson consult.

## 2024-08-13 DIAGNOSIS — J01.00 ACUTE NON-RECURRENT MAXILLARY SINUSITIS: ICD-10-CM

## 2024-08-13 RX ORDER — LORATADINE PSEUDOEPHEDRINE SULFATE 10; 240 MG/1; MG/1
1 TABLET, EXTENDED RELEASE ORAL DAILY
Qty: 30 TABLET | Refills: 1 | Status: SHIPPED | OUTPATIENT
Start: 2024-08-13 | End: 2024-10-12

## 2024-08-19 ENCOUNTER — APPOINTMENT (OUTPATIENT)
Dept: ORTHOPEDIC SURGERY | Facility: CLINIC | Age: 74
End: 2024-08-19
Payer: MEDICARE

## 2024-08-20 ENCOUNTER — APPOINTMENT (OUTPATIENT)
Dept: ORTHOPEDIC SURGERY | Facility: CLINIC | Age: 74
End: 2024-08-20
Payer: MEDICARE

## 2024-08-28 DIAGNOSIS — F41.8 ANXIETY WITH DEPRESSION: ICD-10-CM

## 2024-08-28 DIAGNOSIS — L30.9 ECZEMA, UNSPECIFIED TYPE: ICD-10-CM

## 2024-08-29 RX ORDER — CICLOPIROX OLAMINE 7.7 MG/G
CREAM TOPICAL 2 TIMES DAILY
Qty: 2 G | Refills: 0 | Status: SHIPPED | OUTPATIENT
Start: 2024-08-29

## 2024-08-29 RX ORDER — TRIAMCINOLONE ACETONIDE 1 MG/G
CREAM TOPICAL 2 TIMES DAILY
Qty: 2 G | Refills: 0 | Status: SHIPPED | OUTPATIENT
Start: 2024-08-29

## 2024-08-29 RX ORDER — CITALOPRAM 40 MG/1
40 TABLET, FILM COATED ORAL DAILY
Qty: 90 TABLET | Refills: 1 | Status: SHIPPED | OUTPATIENT
Start: 2024-08-29

## 2024-09-19 DIAGNOSIS — J01.00 ACUTE NON-RECURRENT MAXILLARY SINUSITIS: ICD-10-CM

## 2024-09-19 DIAGNOSIS — E03.9 HYPOTHYROIDISM, UNSPECIFIED TYPE: ICD-10-CM

## 2024-09-19 RX ORDER — TITANIUM DIOXIDE, OCTINOXATE, ZINC OXIDE, 5; 9.7; 2.9 MG/G; MG/G; MG/G
1 CREAM TOPICAL DAILY
Qty: 30 TABLET | Refills: 1 | Status: SHIPPED | OUTPATIENT
Start: 2024-09-19

## 2024-09-19 RX ORDER — LEVOTHYROXINE SODIUM 50 UG/1
50 TABLET ORAL
Qty: 90 TABLET | Refills: 0 | Status: SHIPPED | OUTPATIENT
Start: 2024-09-19

## 2024-12-12 DIAGNOSIS — J01.00 ACUTE NON-RECURRENT MAXILLARY SINUSITIS: ICD-10-CM

## 2024-12-12 DIAGNOSIS — F41.8 ANXIETY WITH DEPRESSION: ICD-10-CM

## 2024-12-13 RX ORDER — CITALOPRAM 40 MG/1
40 TABLET, FILM COATED ORAL DAILY
Qty: 90 TABLET | Refills: 1 | Status: SHIPPED | OUTPATIENT
Start: 2024-12-13

## 2024-12-13 RX ORDER — TITANIUM DIOXIDE, OCTINOXATE, ZINC OXIDE, 5; 9.7; 2.9 MG/G; MG/G; MG/G
1 CREAM TOPICAL DAILY
Qty: 30 TABLET | Refills: 1 | Status: SHIPPED | OUTPATIENT
Start: 2024-12-13

## 2024-12-27 DIAGNOSIS — E03.9 HYPOTHYROIDISM, UNSPECIFIED TYPE: ICD-10-CM

## 2024-12-27 RX ORDER — LEVOTHYROXINE SODIUM 50 UG/1
50 TABLET ORAL
Qty: 90 TABLET | Refills: 0 | Status: SHIPPED | OUTPATIENT
Start: 2024-12-27

## 2025-02-20 DIAGNOSIS — J01.00 ACUTE NON-RECURRENT MAXILLARY SINUSITIS: ICD-10-CM

## 2025-02-20 RX ORDER — TITANIUM DIOXIDE, OCTINOXATE, ZINC OXIDE, 5; 9.7; 2.9 MG/G; MG/G; MG/G
1 CREAM TOPICAL DAILY
Qty: 30 TABLET | Refills: 2 | OUTPATIENT
Start: 2025-02-20

## 2025-03-27 ENCOUNTER — OFFICE VISIT (OUTPATIENT)
Dept: PRIMARY CARE | Facility: CLINIC | Age: 75
End: 2025-03-27
Payer: MEDICARE

## 2025-03-27 VITALS
SYSTOLIC BLOOD PRESSURE: 112 MMHG | TEMPERATURE: 96.8 F | HEART RATE: 77 BPM | WEIGHT: 214.7 LBS | OXYGEN SATURATION: 97 % | DIASTOLIC BLOOD PRESSURE: 80 MMHG | BODY MASS INDEX: 30.81 KG/M2

## 2025-03-27 DIAGNOSIS — R73.03 PREDIABETES: ICD-10-CM

## 2025-03-27 DIAGNOSIS — J01.00 ACUTE NON-RECURRENT MAXILLARY SINUSITIS: ICD-10-CM

## 2025-03-27 DIAGNOSIS — Z13.0 SCREENING FOR DEFICIENCY ANEMIA: ICD-10-CM

## 2025-03-27 DIAGNOSIS — E55.9 VITAMIN D DEFICIENCY: Primary | ICD-10-CM

## 2025-03-27 DIAGNOSIS — F41.8 ANXIETY WITH DEPRESSION: ICD-10-CM

## 2025-03-27 DIAGNOSIS — Z00.00 HEALTHCARE MAINTENANCE: ICD-10-CM

## 2025-03-27 DIAGNOSIS — Z12.5 PROSTATE CANCER SCREENING: ICD-10-CM

## 2025-03-27 DIAGNOSIS — R79.9 ABNORMAL FINDING OF BLOOD CHEMISTRY, UNSPECIFIED: ICD-10-CM

## 2025-03-27 DIAGNOSIS — Z13.89 SCREENING FOR MULTIPLE CONDITIONS: ICD-10-CM

## 2025-03-27 DIAGNOSIS — Z13.220 SCREENING FOR HYPERLIPIDEMIA: ICD-10-CM

## 2025-03-27 DIAGNOSIS — Z13.29 THYROID DISORDER SCREENING: ICD-10-CM

## 2025-03-27 LAB — POC HEMOGLOBIN A1C: 6.2 % (ref 4.2–6.5)

## 2025-03-27 PROCEDURE — 1159F MED LIST DOCD IN RCRD: CPT

## 2025-03-27 PROCEDURE — 99213 OFFICE O/P EST LOW 20 MIN: CPT

## 2025-03-27 PROCEDURE — 1036F TOBACCO NON-USER: CPT

## 2025-03-27 PROCEDURE — 83036 HEMOGLOBIN GLYCOSYLATED A1C: CPT

## 2025-03-27 RX ORDER — TITANIUM DIOXIDE, OCTINOXATE, ZINC OXIDE, 5; 9.7; 2.9 MG/G; MG/G; MG/G
1 CREAM TOPICAL DAILY
Qty: 30 TABLET | Refills: 1 | Status: SHIPPED | OUTPATIENT
Start: 2025-03-27

## 2025-03-27 RX ORDER — CITALOPRAM 40 MG/1
40 TABLET, FILM COATED ORAL DAILY
Qty: 90 TABLET | Refills: 1 | Status: SHIPPED | OUTPATIENT
Start: 2025-03-27

## 2025-03-27 NOTE — PATIENT INSTRUCTIONS
Recheck A1c in 6 months  Healthy diet and plenty of exercise  Walk the golf course    Thank you for coming in today, if any questions or concerns arise, please call my office.   PREET Bryant-CNP

## 2025-03-27 NOTE — PROGRESS NOTES
Subjective   Patient ID: Calos Tillman is a 74 y.o. male who presents for Diabetes (Would like to discuss diabetes, reports feeling fatigued lately, states he does not eat well. He states he took his glucose the other morning and it was 121 fasting. He does state he has urinary frequency as well. //A1c today is 6.2).    HPI    Patient presents for concerns of fatigue, he obtained a fasting glucose which was 121 also having some urinary frequency, A1c was at 6.2    He is already started working on his diet cutting out carbs and exercising more    Possible comorbid issue is his loss of his dog which is causing sleep issues  We will obtain baseline blood work and continue healthy diet and exercise    No leg swelling no palpitations no unexplained weight loss or fever.    Vitals:    03/27/25 1405   BP: 112/80   Pulse: 77   Temp: 36 °C (96.8 °F)   SpO2: 97%       Review of Systems    Objective   Physical Exam    Assessment/Plan   Problem List Items Addressed This Visit    None  Visit Diagnoses       Vitamin D deficiency    -  Primary    Relevant Orders    Vitamin D 25-Hydroxy,Total (for eval of Vitamin D levels)    Anxiety with depression        Relevant Medications    citalopram (CeleXA) 40 mg tablet    Acute non-recurrent maxillary sinusitis        Relevant Medications    loratadine-pseudoephedrine (Allergy Relief D-24hr)  mg 24 hr tablet    Prediabetes        Relevant Orders    POCT glycosylated hemoglobin (Hb A1C) manually resulted (Completed)    Screening for multiple conditions        Relevant Orders    CBC and Auto Differential    Healthcare maintenance        Relevant Orders    Comprehensive Metabolic Panel    Screening for hyperlipidemia        Relevant Orders    Lipid Panel    Thyroid disorder screening        Relevant Orders    TSH with reflex to Free T4 if abnormal    Screening for deficiency anemia        Relevant Orders    Vitamin B12    Prostate cancer screening        Relevant Orders    PSA, total  and free    Abnormal finding of blood chemistry, unspecified        Relevant Orders    CBC and Auto Differential                 Thank you for coming in today, please call my office if you have any concerns or questions.     Stephan OVIEDO, CNP

## 2025-04-21 DIAGNOSIS — L30.9 ECZEMA, UNSPECIFIED TYPE: ICD-10-CM

## 2025-04-21 DIAGNOSIS — E03.9 HYPOTHYROIDISM, UNSPECIFIED TYPE: ICD-10-CM

## 2025-04-21 RX ORDER — CICLOPIROX OLAMINE 7.7 MG/G
CREAM TOPICAL 2 TIMES DAILY
Qty: 2 G | Refills: 0 | Status: SHIPPED | OUTPATIENT
Start: 2025-04-21

## 2025-04-21 RX ORDER — LEVOTHYROXINE SODIUM 50 UG/1
50 TABLET ORAL
Qty: 90 TABLET | Refills: 0 | Status: SHIPPED | OUTPATIENT
Start: 2025-04-21

## 2025-04-21 RX ORDER — TRIAMCINOLONE ACETONIDE 1 MG/G
CREAM TOPICAL 2 TIMES DAILY
Qty: 2 G | Refills: 0 | Status: SHIPPED | OUTPATIENT
Start: 2025-04-21

## 2025-04-30 DIAGNOSIS — J01.00 ACUTE NON-RECURRENT MAXILLARY SINUSITIS: ICD-10-CM

## 2025-05-01 RX ORDER — TITANIUM DIOXIDE, OCTINOXATE, ZINC OXIDE, 5; 9.7; 2.9 MG/G; MG/G; MG/G
1 CREAM TOPICAL DAILY
Qty: 30 TABLET | Refills: 2 | Status: SHIPPED | OUTPATIENT
Start: 2025-05-01

## 2025-05-30 LAB
25(OH)D3+25(OH)D2 SERPL-MCNC: 62 NG/ML (ref 30–100)
ALBUMIN SERPL-MCNC: 4.6 G/DL (ref 3.6–5.1)
ALP SERPL-CCNC: 64 U/L (ref 35–144)
ALT SERPL-CCNC: 37 U/L (ref 9–46)
ANION GAP SERPL CALCULATED.4IONS-SCNC: 8 MMOL/L (CALC) (ref 7–17)
AST SERPL-CCNC: 21 U/L (ref 10–35)
BASOPHILS # BLD AUTO: 40 CELLS/UL (ref 0–200)
BASOPHILS NFR BLD AUTO: 0.8 %
BILIRUB SERPL-MCNC: 0.5 MG/DL (ref 0.2–1.2)
BUN SERPL-MCNC: 15 MG/DL (ref 7–25)
CALCIUM SERPL-MCNC: 9.7 MG/DL (ref 8.6–10.3)
CHLORIDE SERPL-SCNC: 104 MMOL/L (ref 98–110)
CHOLEST SERPL-MCNC: 200 MG/DL
CHOLEST/HDLC SERPL: 4.8 (CALC)
CO2 SERPL-SCNC: 28 MMOL/L (ref 20–32)
CREAT SERPL-MCNC: 0.85 MG/DL (ref 0.7–1.28)
EGFRCR SERPLBLD CKD-EPI 2021: 91 ML/MIN/1.73M2
EOSINOPHIL # BLD AUTO: 90 CELLS/UL (ref 15–500)
EOSINOPHIL NFR BLD AUTO: 1.8 %
ERYTHROCYTE [DISTWIDTH] IN BLOOD BY AUTOMATED COUNT: 14.5 % (ref 11–15)
GLUCOSE SERPL-MCNC: 115 MG/DL (ref 65–99)
HCT VFR BLD AUTO: 47.4 % (ref 38.5–50)
HDLC SERPL-MCNC: 42 MG/DL
HGB BLD-MCNC: 15.4 G/DL (ref 13.2–17.1)
LDLC SERPL CALC-MCNC: 119 MG/DL (CALC)
LYMPHOCYTES # BLD AUTO: 1775 CELLS/UL (ref 850–3900)
LYMPHOCYTES NFR BLD AUTO: 35.5 %
MCH RBC QN AUTO: 27.5 PG (ref 27–33)
MCHC RBC AUTO-ENTMCNC: 32.5 G/DL (ref 32–36)
MCV RBC AUTO: 84.8 FL (ref 80–100)
MONOCYTES # BLD AUTO: 450 CELLS/UL (ref 200–950)
MONOCYTES NFR BLD AUTO: 9 %
NEUTROPHILS # BLD AUTO: 2645 CELLS/UL (ref 1500–7800)
NEUTROPHILS NFR BLD AUTO: 52.9 %
NONHDLC SERPL-MCNC: 158 MG/DL (CALC)
PLATELET # BLD AUTO: 261 THOUSAND/UL (ref 140–400)
PMV BLD REES-ECKER: 9.9 FL (ref 7.5–12.5)
POTASSIUM SERPL-SCNC: 4.7 MMOL/L (ref 3.5–5.3)
PROT SERPL-MCNC: 7.5 G/DL (ref 6.1–8.1)
PSA FREE MFR SERPL: NORMAL %
PSA FREE SERPL-MCNC: NORMAL NG/ML
PSA SERPL-MCNC: NORMAL NG/ML
RBC # BLD AUTO: 5.59 MILLION/UL (ref 4.2–5.8)
SODIUM SERPL-SCNC: 140 MMOL/L (ref 135–146)
TRIGL SERPL-MCNC: 275 MG/DL
TSH SERPL-ACNC: 1.27 MIU/L (ref 0.4–4.5)
VIT B12 SERPL-MCNC: 647 PG/ML (ref 200–1100)
WBC # BLD AUTO: 5 THOUSAND/UL (ref 3.8–10.8)

## 2025-06-02 LAB
25(OH)D3+25(OH)D2 SERPL-MCNC: 62 NG/ML (ref 30–100)
ALBUMIN SERPL-MCNC: 4.6 G/DL (ref 3.6–5.1)
ALP SERPL-CCNC: 64 U/L (ref 35–144)
ALT SERPL-CCNC: 37 U/L (ref 9–46)
ANION GAP SERPL CALCULATED.4IONS-SCNC: 8 MMOL/L (CALC) (ref 7–17)
AST SERPL-CCNC: 21 U/L (ref 10–35)
BASOPHILS # BLD AUTO: 40 CELLS/UL (ref 0–200)
BASOPHILS NFR BLD AUTO: 0.8 %
BILIRUB SERPL-MCNC: 0.5 MG/DL (ref 0.2–1.2)
BUN SERPL-MCNC: 15 MG/DL (ref 7–25)
CALCIUM SERPL-MCNC: 9.7 MG/DL (ref 8.6–10.3)
CHLORIDE SERPL-SCNC: 104 MMOL/L (ref 98–110)
CHOLEST SERPL-MCNC: 200 MG/DL
CHOLEST/HDLC SERPL: 4.8 (CALC)
CO2 SERPL-SCNC: 28 MMOL/L (ref 20–32)
CREAT SERPL-MCNC: 0.85 MG/DL (ref 0.7–1.28)
EGFRCR SERPLBLD CKD-EPI 2021: 91 ML/MIN/1.73M2
EOSINOPHIL # BLD AUTO: 90 CELLS/UL (ref 15–500)
EOSINOPHIL NFR BLD AUTO: 1.8 %
ERYTHROCYTE [DISTWIDTH] IN BLOOD BY AUTOMATED COUNT: 14.5 % (ref 11–15)
GLUCOSE SERPL-MCNC: 115 MG/DL (ref 65–99)
HCT VFR BLD AUTO: 47.4 % (ref 38.5–50)
HDLC SERPL-MCNC: 42 MG/DL
HGB BLD-MCNC: 15.4 G/DL (ref 13.2–17.1)
LDLC SERPL CALC-MCNC: 119 MG/DL (CALC)
LYMPHOCYTES # BLD AUTO: 1775 CELLS/UL (ref 850–3900)
LYMPHOCYTES NFR BLD AUTO: 35.5 %
MCH RBC QN AUTO: 27.5 PG (ref 27–33)
MCHC RBC AUTO-ENTMCNC: 32.5 G/DL (ref 32–36)
MCV RBC AUTO: 84.8 FL (ref 80–100)
MONOCYTES # BLD AUTO: 450 CELLS/UL (ref 200–950)
MONOCYTES NFR BLD AUTO: 9 %
NEUTROPHILS # BLD AUTO: 2645 CELLS/UL (ref 1500–7800)
NEUTROPHILS NFR BLD AUTO: 52.9 %
NONHDLC SERPL-MCNC: 158 MG/DL (CALC)
PLATELET # BLD AUTO: 261 THOUSAND/UL (ref 140–400)
PMV BLD REES-ECKER: 9.9 FL (ref 7.5–12.5)
POTASSIUM SERPL-SCNC: 4.7 MMOL/L (ref 3.5–5.3)
PROT SERPL-MCNC: 7.5 G/DL (ref 6.1–8.1)
PSA FREE MFR SERPL: 17 % (CALC)
PSA FREE SERPL-MCNC: 0.3 NG/ML
PSA SERPL-MCNC: 1.8 NG/ML
RBC # BLD AUTO: 5.59 MILLION/UL (ref 4.2–5.8)
SODIUM SERPL-SCNC: 140 MMOL/L (ref 135–146)
TRIGL SERPL-MCNC: 275 MG/DL
TSH SERPL-ACNC: 1.27 MIU/L (ref 0.4–4.5)
VIT B12 SERPL-MCNC: 647 PG/ML (ref 200–1100)
WBC # BLD AUTO: 5 THOUSAND/UL (ref 3.8–10.8)

## 2025-06-04 ENCOUNTER — APPOINTMENT (OUTPATIENT)
Dept: PRIMARY CARE | Facility: CLINIC | Age: 75
End: 2025-06-04
Payer: MEDICARE

## 2025-06-04 VITALS
DIASTOLIC BLOOD PRESSURE: 72 MMHG | TEMPERATURE: 97.9 F | HEIGHT: 72 IN | WEIGHT: 212 LBS | OXYGEN SATURATION: 97 % | BODY MASS INDEX: 28.71 KG/M2 | SYSTOLIC BLOOD PRESSURE: 128 MMHG | HEART RATE: 65 BPM

## 2025-06-04 DIAGNOSIS — E78.2 MODERATE MIXED HYPERLIPIDEMIA NOT REQUIRING STATIN THERAPY: ICD-10-CM

## 2025-06-04 DIAGNOSIS — R53.83 OTHER FATIGUE: ICD-10-CM

## 2025-06-04 DIAGNOSIS — Z13.6 SCREENING FOR CARDIOVASCULAR CONDITION: ICD-10-CM

## 2025-06-04 DIAGNOSIS — Z71.89 CARDIAC RISK COUNSELING: ICD-10-CM

## 2025-06-04 DIAGNOSIS — Z00.00 ROUTINE GENERAL MEDICAL EXAMINATION AT HEALTH CARE FACILITY: Primary | ICD-10-CM

## 2025-06-04 DIAGNOSIS — Z72.89 OTHER PROBLEMS RELATED TO LIFESTYLE: ICD-10-CM

## 2025-06-04 DIAGNOSIS — Z00.00 ROUTINE GENERAL MEDICAL EXAMINATION AT A HEALTH CARE FACILITY: ICD-10-CM

## 2025-06-04 DIAGNOSIS — Z13.1 DIABETES MELLITUS SCREENING: ICD-10-CM

## 2025-06-04 DIAGNOSIS — E78.2 MIXED HYPERLIPIDEMIA: ICD-10-CM

## 2025-06-04 DIAGNOSIS — Z13.220 SCREENING FOR HYPERLIPIDEMIA: ICD-10-CM

## 2025-06-04 DIAGNOSIS — Z13.89 SCREENING FOR MULTIPLE CONDITIONS: ICD-10-CM

## 2025-06-04 DIAGNOSIS — Z13.1 SCREENING FOR DIABETES MELLITUS: ICD-10-CM

## 2025-06-04 RX ORDER — ROSUVASTATIN CALCIUM 5 MG/1
5 TABLET, COATED ORAL DAILY
Qty: 90 TABLET | Refills: 0 | Status: SHIPPED | OUTPATIENT
Start: 2025-06-04 | End: 2025-09-02

## 2025-06-04 ASSESSMENT — PATIENT HEALTH QUESTIONNAIRE - PHQ9
1. LITTLE INTEREST OR PLEASURE IN DOING THINGS: NOT AT ALL
2. FEELING DOWN, DEPRESSED OR HOPELESS: NOT AT ALL
SUM OF ALL RESPONSES TO PHQ9 QUESTIONS 1 AND 2: 0

## 2025-06-04 ASSESSMENT — ENCOUNTER SYMPTOMS
ABDOMINAL PAIN: 0
ACTIVITY CHANGE: 0
PSYCHIATRIC NEGATIVE: 1
UNEXPECTED WEIGHT CHANGE: 0
DIZZINESS: 0
ALLERGIC/IMMUNOLOGIC NEGATIVE: 1
GASTROINTESTINAL NEGATIVE: 1
WEAKNESS: 0
FEVER: 0
HEADACHES: 0
OCCASIONAL FEELINGS OF UNSTEADINESS: 0
DEPRESSION: 0
CARDIOVASCULAR NEGATIVE: 1
FATIGUE: 0
ENDOCRINE NEGATIVE: 1
MUSCULOSKELETAL NEGATIVE: 1
RESPIRATORY NEGATIVE: 1
SHORTNESS OF BREATH: 0
LOSS OF SENSATION IN FEET: 0

## 2025-06-04 ASSESSMENT — ACTIVITIES OF DAILY LIVING (ADL)
TAKING_MEDICATION: INDEPENDENT
BATHING: INDEPENDENT
DRESSING: INDEPENDENT
GROCERY_SHOPPING: INDEPENDENT
MANAGING_FINANCES: INDEPENDENT
DOING_HOUSEWORK: INDEPENDENT

## 2025-06-04 NOTE — PATIENT INSTRUCTIONS
Return in 4 months A1c check  Healthy diet exercise  Walk [place] the golf course!    Statin start  Recheck lipid panel FASTING at next appt.    Please adjust September appt to 9am to assure we can draw lipid and testosterone    Thank you for coming in today, if any questions or concerns arise, please call my office.   Stephan Stiles, PREET-CNP

## 2025-06-04 NOTE — PROGRESS NOTES
"Subjective   Reason for Visit: Calos Tillman is an 75 y.o. male here for a Medicare Wellness visit.     Past Medical, Surgical, and Family History reviewed and updated in chart.    Reviewed all medications by prescribing practitioner or clinical pharmacist (such as prescriptions, OTCs, herbal therapies and supplements) and documented in the medical record.      Medicare annual wellness  Doing well, active with target shooting his bow, and golfing  Cares for his wife, and their dog    Ascvd risk elevated, start STATIN and recheck lipids in 3 months along with his regular A1c check.        Patient Care Team:  SHMUEL Bryant as PCP - General (Family Medicine)  SHMUEL Bryant as PCP - [employer] PCP     Review of Systems   Constitutional:  Negative for activity change, fatigue, fever and unexpected weight change.   HENT: Negative.     Respiratory: Negative.  Negative for shortness of breath.    Cardiovascular: Negative.  Negative for chest pain.   Gastrointestinal: Negative.  Negative for abdominal pain.   Endocrine: Negative.    Musculoskeletal: Negative.    Skin: Negative.    Allergic/Immunologic: Negative.    Neurological:  Negative for dizziness, weakness and headaches.   Psychiatric/Behavioral: Negative.         Objective   Vitals:  /72   Pulse 65   Temp 36.6 °C (97.9 °F)   Ht 1.82 m (5' 11.65\")   Wt 96.2 kg (212 lb)   SpO2 97%   BMI 29.03 kg/m²       Physical Exam  Vitals and nursing note reviewed.   Constitutional:       Appearance: Normal appearance.   HENT:      Head: Normocephalic.      Mouth/Throat:      Mouth: Mucous membranes are moist.   Cardiovascular:      Rate and Rhythm: Normal rate and regular rhythm.      Pulses: Normal pulses.      Heart sounds: Normal heart sounds. No murmur heard.     No friction rub. No gallop.   Pulmonary:      Effort: Pulmonary effort is normal. No respiratory distress.      Breath sounds: Normal breath sounds. No wheezing. "   Abdominal:      General: Bowel sounds are normal. There is no distension.      Palpations: Abdomen is soft.      Tenderness: There is no abdominal tenderness.   Musculoskeletal:         General: No deformity. Normal range of motion.   Skin:     General: Skin is warm and dry.      Capillary Refill: Capillary refill takes less than 2 seconds.   Neurological:      General: No focal deficit present.      Mental Status: He is alert and oriented to person, place, and time.   Psychiatric:         Mood and Affect: Mood normal.         Assessment & Plan  Routine general medical examination at health care facility    Orders:    1 Year Follow Up In Primary Care - Wellness Exam; Future    Screening for multiple conditions         Diabetes mellitus screening         Screening for cardiovascular condition         Cardiac risk counseling         Other problems related to lifestyle         Routine general medical examination at a health care facility         Screening for diabetes mellitus         Screening for hyperlipidemia         Mixed hyperlipidemia    Orders:    rosuvastatin (Crestor) 5 mg tablet; Take 1 tablet (5 mg) by mouth once daily.    Moderate mixed hyperlipidemia not requiring statin therapy    Orders:    Lipid Panel; Future    Other fatigue    Orders:    Testosterone,Free and Total; Future           Advance Directives Discussion  16 - 20 minutes were spent discussing Advanced Care Planning (including a Living Will, Medical Power Of , as well as specific end of life choices and/or directives). The details of that discussion were documented in Advanced Directives Discussion section of the medical record.     Full Code  POA daughter. Working on living will, life insurance.    Alcohol Misuse Screen  5 - 10 minutes were spent screening the patient for alcohol misuse disorder.    Cardiac Risk Assessment  15 - 20 minutes were spent discussing Cardiovascular risk and, if needed, lifestyle modifications were  recommended, including nutritional choices, exercise, and elimination of habits contributing to risk.   Aspirin use/disuse was discussed following the guidelines below:  low dose ASA ( mg) should be considered:    If prior Heart Attack/Stroke/Peripheral vascular disease:  Generally recommend daily low dose aspirin unless extremely high bleeding risk (e.g., gastrointestinal).    If no prior Heart Attack/Stroke/Peripheral vascular disease:              Age over 70: Do not use Aspirin for prevention    Age less than 70 and 10-year cardiovascular disease risk is >20%: use low dose Aspirin for prevention.      The 10-year ASCVD risk score (Sarah MOORE, et al., 2019) is: 24.7%    Values used to calculate the score:      Age: 75 years      Sex: Male      Is Non- : No      Diabetic: No      Tobacco smoker: No      Systolic Blood Pressure: 128 mmHg      Is BP treated: No      HDL Cholesterol: 42 mg/dL      Total Cholesterol: 200 mg/dL    Depression Screening  5 - 10 minutes were spent screening for depression.    Patient Health Questionnaire-2 Score: 0 (6/4/2025  9:44 AM)     Obesity Counseling  15 - 20 minutes were spent counseling on diet and exercise interventions to address obesity and weight reduction.

## 2025-07-09 DIAGNOSIS — F41.8 ANXIETY WITH DEPRESSION: ICD-10-CM

## 2025-07-09 RX ORDER — CITALOPRAM 40 MG/1
40 TABLET ORAL DAILY
Qty: 90 TABLET | Refills: 1 | Status: SHIPPED | OUTPATIENT
Start: 2025-07-09

## 2025-08-07 DIAGNOSIS — E03.9 HYPOTHYROIDISM, UNSPECIFIED TYPE: ICD-10-CM

## 2025-08-07 RX ORDER — LEVOTHYROXINE SODIUM 50 UG/1
50 TABLET ORAL
Qty: 90 TABLET | Refills: 0 | Status: SHIPPED | OUTPATIENT
Start: 2025-08-07

## 2025-09-29 ENCOUNTER — APPOINTMENT (OUTPATIENT)
Dept: PRIMARY CARE | Facility: CLINIC | Age: 75
End: 2025-09-29
Payer: MEDICARE